# Patient Record
Sex: FEMALE | URBAN - METROPOLITAN AREA
[De-identification: names, ages, dates, MRNs, and addresses within clinical notes are randomized per-mention and may not be internally consistent; named-entity substitution may affect disease eponyms.]

---

## 2023-04-05 ENCOUNTER — APPOINTMENT (RX ONLY)
Dept: URBAN - METROPOLITAN AREA CLINIC 151 | Facility: CLINIC | Age: 23
Setting detail: DERMATOLOGY
End: 2023-04-05

## 2023-04-05 DIAGNOSIS — L72.0 EPIDERMAL CYST: ICD-10-CM

## 2023-04-05 PROCEDURE — ? COUNSELING

## 2023-04-05 PROCEDURE — 99202 OFFICE O/P NEW SF 15 MIN: CPT

## 2023-04-05 PROCEDURE — ? DIAGNOSIS COMMENT

## 2023-04-05 NOTE — PROCEDURE: DIAGNOSIS COMMENT
Render Risk Assessment In Note?: no
Detail Level: Detailed
Comment: Lesions of pts concern per HPI. Counseled re: treatment options including retinols vs destructive treatments. Pt will first start with OTC retinol containing eye creams (information provided for OTC [facility]), discussed possible side effects of irritation and dryness. If no improvement with topicals or finding them too drying, recommend follow-up with oculoplastics for destructive treatments given proximity to eye (Charisse Pan, Tariq Hickey MD).